# Patient Record
Sex: MALE | Race: WHITE
[De-identification: names, ages, dates, MRNs, and addresses within clinical notes are randomized per-mention and may not be internally consistent; named-entity substitution may affect disease eponyms.]

---

## 2018-05-14 ENCOUNTER — HOSPITAL ENCOUNTER (EMERGENCY)
Dept: HOSPITAL 63 - ER | Age: 33
Discharge: HOME | End: 2018-05-14
Payer: OTHER GOVERNMENT

## 2018-05-14 VITALS — DIASTOLIC BLOOD PRESSURE: 72 MMHG | SYSTOLIC BLOOD PRESSURE: 133 MMHG

## 2018-05-14 VITALS — HEIGHT: 66 IN | BODY MASS INDEX: 37.77 KG/M2 | WEIGHT: 235 LBS

## 2018-05-14 DIAGNOSIS — G89.29: ICD-10-CM

## 2018-05-14 DIAGNOSIS — N50.812: Primary | ICD-10-CM

## 2018-05-14 DIAGNOSIS — N43.3: ICD-10-CM

## 2018-05-14 DIAGNOSIS — R31.9: ICD-10-CM

## 2018-05-14 LAB
APTT PPP: (no result) S
BACTERIA #/AREA URNS HPF: 0 /HPF
BILIRUB UR QL STRIP: (no result)
FIBRINOGEN PPP-MCNC: (no result) MG/DL
GLUCOSE UR STRIP-MCNC: (no result) MG/DL
NITRITE UR QL STRIP: (no result)
RBC #/AREA URNS HPF: (no result) /HPF (ref 0–2)
SP GR UR STRIP: 1.01
SQUAMOUS #/AREA URNS LPF: (no result) /LPF
UROBILINOGEN UR-MCNC: (no result) MG/DL
WBC #/AREA URNS HPF: 0 /HPF (ref 0–4)

## 2018-05-14 PROCEDURE — 81001 URINALYSIS AUTO W/SCOPE: CPT

## 2018-05-14 PROCEDURE — 76870 US EXAM SCROTUM: CPT

## 2018-05-14 NOTE — RAD
Scrotal ultrasound, 5/14/2018:

 

HISTORY: Left testicular pain and hematuria

 

The right testicle measures 3.8 x 2.9 x 2.2 cm while the left testicle 

measures 3.2 x 3.4 x 2.2 cm. No testicular mass is seen. There is 

symmetric blood flow within the testicles. No epididymal abnormality is 

detected. A small amount of fluid is present in the scrotal sac 

bilaterally.

 

IMPRESSION:

1. No testicular abnormality is detected.

2. Minimal bilateral hydroceles.

 

Electronically signed by: Rick Moritz, MD (5/14/2018 5:09 PM) [facility]

## 2018-05-14 NOTE — PHYS DOC
Past History


Past Medical History:  No Pertinent History


Past Surgical History:  No Surgical History


Alcohol Use:  Rarely


Drug Use:  None





Adult General


Chief Complaint


Chief Complaint:  BLOOD IN URINE





[facility]


HPI


32-year-old male presents with left testicular pain and hematuria. Feeling well 

up until this morning when he went to urinate and noticed it was brownish in 

color. A few hours later at work when he urinated a second time he saw bright 

red blood and had left testicle pain. Continues to have an intermittent crampy 

pain of the left testicle and groin. On arrival in the ED he urinated and it is 

brownish in color again. He denies fever or chills. He's never had anything 

like this before. He denies any back pain except for his chronic low back pain. 

Never had a kidney stone before. He denies any trauma or overuse recently.





Review of Systems


Review of Systems





Constitutional: Denies fever or chills []


Eyes: Denies change in visual acuity, redness, or eye pain []


HENT: Denies nasal congestion or sore throat []


Respiratory: Denies cough or shortness of breath []


Cardiovascular: No additional information not addressed in HPI []


GI: Denies abdominal pain, nausea, vomiting, bloody stools or diarrhea []


: left testicle pain, hematuria[]


Musculoskeletal: Denies back pain or joint pain []


Integument: Denies rash or skin lesions []


Neurologic: Denies headache, focal weakness or sensory changes []


Endocrine: Denies polyuria or polydipsia []





All other systems were reviewed and found to be within normal limits, except as 

documented in this note.





Allergies


Allergies





Allergies








Coded Allergies Type Severity Reaction Last Updated Verified


 


  No Known Drug Allergies    5/14/18 No











Physical Exam


Physical Exam





Constitutional: Well developed, well nourished, no acute distress, non-toxic 

appearance. []


HENT: Normocephalic, atraumatic, bilateral external ears normal, oropharynx 

moist, no oral exudates, nose normal. []


Eyes: PERRLA, EOMI, conjunctiva normal, no discharge. [] 


Neck: Normal range of motion, no tenderness, supple, no stridor. [] 


Cardiovascular:Heart rate regular rhythm, no murmur []


Lungs & Thorax:  Bilateral breath sounds clear to auscultation []


Abdomen: Bowel sounds normal, soft, no tenderness, no masses, no pulsatile 

masses. [] 


Skin: Warm, dry, no erythema, no rash. [] 


Back: No tenderness, no CVA tenderness. [] 


Extremities: No tenderness, no cyanosis, no clubbing, ROM intact, no edema. [] 


Neurologic: Alert and oriented X 3, normal motor function, normal sensory 

function, no focal deficits noted. []


Psychologic: Affect normal, judgement normal, mood normal. 


:  mild tenderness of left testicle and inguinal region, circumcised, normal 

exam of penis[]





Current Patient Data


Vital Signs





 Vital Signs








  Date Time  Temp Pulse Resp B/P (MAP) Pulse Ox O2 Delivery O2 Flow Rate FiO2


 


5/14/18 15:35 98.6 66 12  98 Room Air  











EKG


EKG


[]





Radiology/Procedures


Radiology/Procedures


Scrotal ultrasound, 5/14/2018:


 


HISTORY: Left testicular pain and hematuria


 


The right testicle measures 3.8 x 2.9 x 2.2 cm while the left testicle 


measures 3.2 x 3.4 x 2.2 cm. No testicular mass is seen. There is 


symmetric blood flow within the testicles. No epididymal abnormality is 


detected. A small amount of fluid is present in the scrotal sac 


bilaterally.


 


IMPRESSION:


1. No testicular abnormality is detected.


2. Minimal bilateral hydroceles.


 


Electronically signed by: Rick Moritz, MD (5/14/2018 5:09 PM) [facility][]





Course & Med Decision Making


Course & Med Decision Making


Pertinent Labs and Imaging studies reviewed. (See chart for details)


The patient's testicular ultrasound is negative for torsion or other 

abnormality. Epididymis is not inflamed. His urinalysis is negative for 

infection. It does show red blood cells without white cells. STD results with 

the patient. He understands that he needs to follow up this hematuria and 1/2-2 

weeks to make sure it is cleared up. He does not he will have further 

evaluation by urology. He is stable for discharge at this time.


[]





Dragon Disclaimer


Dragon Disclaimer


This electronic medical record was generated, in whole or in part, using a 

voice recognition dictation system.





Departure


Departure:


Referrals:  


MINDY SANDERS DO (PCP)











LIBBY SMITH DO May 14, 2018 16:10

## 2018-09-11 ENCOUNTER — HOSPITAL ENCOUNTER (EMERGENCY)
Dept: HOSPITAL 63 - ER | Age: 33
Discharge: HOME | End: 2018-09-11
Payer: OTHER GOVERNMENT

## 2018-09-11 VITALS — HEIGHT: 67 IN | WEIGHT: 315 LBS | BODY MASS INDEX: 49.44 KG/M2

## 2018-09-11 VITALS — DIASTOLIC BLOOD PRESSURE: 66 MMHG | SYSTOLIC BLOOD PRESSURE: 122 MMHG

## 2018-09-11 DIAGNOSIS — L53.8: ICD-10-CM

## 2018-09-11 DIAGNOSIS — L29.9: ICD-10-CM

## 2018-09-11 DIAGNOSIS — R21: Primary | ICD-10-CM

## 2018-09-11 DIAGNOSIS — R79.89: ICD-10-CM

## 2018-09-11 LAB
ANION GAP SERPL CALC-SCNC: 10 MMOL/L (ref 6–14)
BASOPHILS # BLD AUTO: 0 X10^3/UL (ref 0–0.2)
BASOPHILS NFR BLD: 0 % (ref 0–3)
CA-I SERPL ISE-MCNC: 27 MG/DL (ref 8–26)
CALCIUM SERPL-MCNC: 9.4 MG/DL (ref 8.5–10.1)
CHLORIDE SERPL-SCNC: 103 MMOL/L (ref 98–107)
CO2 SERPL-SCNC: 27 MMOL/L (ref 21–32)
CREAT SERPL-MCNC: 1.6 MG/DL (ref 0.7–1.3)
EOSINOPHIL NFR BLD: 0.2 X10^3/UL (ref 0–0.7)
EOSINOPHIL NFR BLD: 1 % (ref 0–3)
ERYTHROCYTE [DISTWIDTH] IN BLOOD BY AUTOMATED COUNT: 13.1 % (ref 11.5–14.5)
GFR SERPLBLD BASED ON 1.73 SQ M-ARVRAT: 49.7 ML/MIN
GLUCOSE SERPL-MCNC: 120 MG/DL (ref 70–99)
HCT VFR BLD CALC: 44.9 % (ref 39–53)
HGB BLD-MCNC: 15 G/DL (ref 13–17.5)
LYMPHOCYTES # BLD: 2.6 X10^3/UL (ref 1–4.8)
LYMPHOCYTES NFR BLD AUTO: 20 % (ref 24–48)
MCH RBC QN AUTO: 30 PG (ref 25–35)
MCHC RBC AUTO-ENTMCNC: 33 G/DL (ref 31–37)
MCV RBC AUTO: 88 FL (ref 79–100)
MONO #: 0.7 X10^3/UL (ref 0–1.1)
MONOCYTES NFR BLD: 5 % (ref 0–9)
NEUT #: 9.6 X10^3UL (ref 1.8–7.7)
NEUTROPHILS NFR BLD AUTO: 74 % (ref 31–73)
PLATELET # BLD AUTO: 231 X10^3/UL (ref 140–400)
POTASSIUM SERPL-SCNC: 4.5 MMOL/L (ref 3.5–5.1)
RBC # BLD AUTO: 5.09 X10^6/UL (ref 4.3–5.7)
SODIUM SERPL-SCNC: 140 MMOL/L (ref 136–145)
WBC # BLD AUTO: 13.1 X10^3/UL (ref 4–11)

## 2018-09-11 PROCEDURE — S0028 INJECTION, FAMOTIDINE, 20 MG: HCPCS

## 2018-09-11 PROCEDURE — 96374 THER/PROPH/DIAG INJ IV PUSH: CPT

## 2018-09-11 PROCEDURE — 85025 COMPLETE CBC W/AUTO DIFF WBC: CPT

## 2018-09-11 PROCEDURE — 80048 BASIC METABOLIC PNL TOTAL CA: CPT

## 2018-09-11 PROCEDURE — 99284 EMERGENCY DEPT VISIT MOD MDM: CPT

## 2018-09-11 PROCEDURE — 96375 TX/PRO/DX INJ NEW DRUG ADDON: CPT

## 2018-09-11 PROCEDURE — 36415 COLL VENOUS BLD VENIPUNCTURE: CPT
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
